# Patient Record
Sex: MALE | Race: WHITE | NOT HISPANIC OR LATINO | Employment: STUDENT | URBAN - METROPOLITAN AREA
[De-identification: names, ages, dates, MRNs, and addresses within clinical notes are randomized per-mention and may not be internally consistent; named-entity substitution may affect disease eponyms.]

---

## 2018-11-09 ENCOUNTER — OFFICE VISIT (OUTPATIENT)
Dept: INTERNAL MEDICINE | Facility: CLINIC | Age: 23
End: 2018-11-09
Payer: COMMERCIAL

## 2018-11-09 VITALS
SYSTOLIC BLOOD PRESSURE: 125 MMHG | OXYGEN SATURATION: 98 % | WEIGHT: 140.19 LBS | BODY MASS INDEX: 20.76 KG/M2 | DIASTOLIC BLOOD PRESSURE: 85 MMHG | HEIGHT: 69 IN | HEART RATE: 79 BPM

## 2018-11-09 DIAGNOSIS — G43.009 MIGRAINE WITHOUT AURA AND WITHOUT STATUS MIGRAINOSUS, NOT INTRACTABLE: Primary | ICD-10-CM

## 2018-11-09 PROCEDURE — 99999 PR PBB SHADOW E&M-EST. PATIENT-LVL III: CPT | Mod: PBBFAC,,, | Performed by: STUDENT IN AN ORGANIZED HEALTH CARE EDUCATION/TRAINING PROGRAM

## 2018-11-09 PROCEDURE — 3008F BODY MASS INDEX DOCD: CPT | Mod: CPTII,S$GLB,, | Performed by: STUDENT IN AN ORGANIZED HEALTH CARE EDUCATION/TRAINING PROGRAM

## 2018-11-09 PROCEDURE — 99203 OFFICE O/P NEW LOW 30 MIN: CPT | Mod: S$GLB,,, | Performed by: STUDENT IN AN ORGANIZED HEALTH CARE EDUCATION/TRAINING PROGRAM

## 2018-11-09 NOTE — PROGRESS NOTES
Internal Medicine Clinic Note  11/9/2018      Subjective:       Patient ID: Mat Toth is a 23 y.o. male being seen for an established visit.    Chief Complaint: Migraine      HPI  Patient is a 23 year old male that presents to clinic for headaches that started roughly 1 hour ago while he was in class. There was no acute inciting event, but he found that he had a throbbing headache behind his right eye that was accompanied by nausea, dizziness, and light sensitivity. The pain waxed and waned and by the time he arrived in clinic the pain had improved; however he still feels a bit sensitive to light. He denies any episodes of vomiting or neck stiffness and has remained afebrile. Patient has had headaches in the past that felt like a band of pressure around his head; but current headache feels much different from the one he is feeling now. He reports  no history of seizures. He has one cousin that had seizures as a child but no longer has them. He reports only minor trauma that occured a week ago when he hit his head when trying to get out of bed. Patient came straight to clinic when he was able to get an appointment and has tried nothing for the headache. 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     Past Medical History:   Diagnosis Date    Asthma     CMV (cytomegalovirus infection)     Pertussis        Past Surgical History:   Procedure Laterality Date    ADENOIDECTOMY      TYMPANOSTOMY TUBE PLACEMENT         No family history on file.    Social History     Socioeconomic History    Marital status: Single     Spouse name: Not on file    Number of children: Not on file    Years of education: Not on file    Highest education level: Not on file   Social Needs    Financial resource strain: Not on file    Food insecurity - worry: Not on file    Food insecurity - inability: Not on file    Transportation needs - medical: Not on file    Transportation needs -  "non-medical: Not on file   Occupational History    Not on file   Tobacco Use    Smoking status: Never Smoker   Substance and Sexual Activity    Alcohol use: No    Drug use: No    Sexual activity: No   Other Topics Concern    Not on file   Social History Narrative    Pt lives [address] with parents, Deepak and Ania-. Brother Obi, Graduated     On swim team competition teams.       Review of Systems   Constitutional: Negative for activity change.   HENT: Negative for hearing loss, sinus pain and tinnitus.    Eyes: Positive for photophobia. Negative for discharge and visual disturbance.   Respiratory: Negative for cough, chest tightness, shortness of breath and wheezing.    Cardiovascular: Negative for chest pain, palpitations and leg swelling.   Gastrointestinal: Negative for abdominal pain, constipation, diarrhea, nausea and vomiting.   Musculoskeletal: Negative for arthralgias, back pain and myalgias.   Skin: Negative for color change and pallor.   Neurological: Positive for dizziness and headaches. Negative for weakness.          Medication List      as of 11/9/2018  2:07 PM     You have not been prescribed any medications.         Patient Active Problem List    Diagnosis Date Noted    CMV (cytomegalovirus infection)     Pertussis            Objective:      /88 (BP Location: Right arm, Patient Position: Sitting, BP Method: Large (Manual))   Pulse 79   Ht 5' 8.9" (1.75 m)   Wt 63.6 kg (140 lb 3.4 oz)   SpO2 98%   BMI 20.77 kg/m²   Estimated body mass index is 20.77 kg/m² as calculated from the following:    Height as of this encounter: 5' 8.9" (1.75 m).    Weight as of this encounter: 63.6 kg (140 lb 3.4 oz).    Physical Exam   Constitutional: He is oriented to person, place, and time. He appears well-developed and well-nourished. No distress.   HENT:   Head: Normocephalic and atraumatic.   Mouth/Throat: No oropharyngeal exudate.   Eyes: Conjunctivae and EOM are normal. Pupils are equal, " round, and reactive to light.   Neck: Normal range of motion. Neck supple.   Cardiovascular: Normal rate, regular rhythm and normal heart sounds.   Pulmonary/Chest: Effort normal and breath sounds normal.   Abdominal: Soft. Bowel sounds are normal.   Musculoskeletal: Normal range of motion.   Neurological: He is alert and oriented to person, place, and time. No cranial nerve deficit or sensory deficit. He exhibits normal muscle tone. Coordination normal.   Psychiatric: He has a normal mood and affect. His behavior is normal.   Vitals reviewed.      Assessment:         1. Migraine without aura and without status migrainosus, not intractable           Plan:         1. Migraine without aura and without status migrainosus, not intractable  - Recommended over the counter ibuprofen 800 mg every 4 to 6 hours as needed for pain.  - patient is normally very active with school and swimming. Advised patient to get 1-2 days bed rest prior to resuming activities  - Advised patient to stay well hydrated  - Advised patient to return to clinic if symptoms fail to improve or worsen. If symptoms become considerably worse, advised to go straight to emergency department.             Patient seen and plan of care discussed with Dr. Declan Florence  Internal Medicine, PGY-1  [phone]

## 2018-11-09 NOTE — PATIENT INSTRUCTIONS
Medication for this can be found over the counter    - Try Ibuprofen 800 mg every 4 to 6 hours as needed for pain  - Stay well hydrated  - Bed rest for today  - Please return to clinic or go to emergency room if symptoms get worse.

## 2020-01-07 ENCOUNTER — OFFICE VISIT (OUTPATIENT)
Dept: URGENT CARE | Facility: CLINIC | Age: 25
End: 2020-01-07
Payer: COMMERCIAL

## 2020-01-07 VITALS
HEART RATE: 72 BPM | DIASTOLIC BLOOD PRESSURE: 76 MMHG | WEIGHT: 140 LBS | BODY MASS INDEX: 20.73 KG/M2 | RESPIRATION RATE: 18 BRPM | SYSTOLIC BLOOD PRESSURE: 142 MMHG | OXYGEN SATURATION: 96 % | TEMPERATURE: 100 F | HEIGHT: 69 IN

## 2020-01-07 DIAGNOSIS — J00 NASOPHARYNGITIS: Primary | ICD-10-CM

## 2020-01-07 DIAGNOSIS — R05.9 COUGH: ICD-10-CM

## 2020-01-07 DIAGNOSIS — R03.0 BLOOD PRESSURE ELEVATED WITHOUT HISTORY OF HTN: ICD-10-CM

## 2020-01-07 LAB
CTP QC/QA: YES
CTP QC/QA: YES
FLUAV AG NPH QL: NEGATIVE
FLUBV AG NPH QL: NEGATIVE
S PYO RRNA THROAT QL PROBE: NEGATIVE

## 2020-01-07 PROCEDURE — 87804 INFLUENZA ASSAY W/OPTIC: CPT | Mod: QW,S$GLB,, | Performed by: STUDENT IN AN ORGANIZED HEALTH CARE EDUCATION/TRAINING PROGRAM

## 2020-01-07 PROCEDURE — 87804 POCT INFLUENZA A/B: ICD-10-PCS | Mod: 59,QW,S$GLB, | Performed by: STUDENT IN AN ORGANIZED HEALTH CARE EDUCATION/TRAINING PROGRAM

## 2020-01-07 PROCEDURE — 99203 OFFICE O/P NEW LOW 30 MIN: CPT | Mod: 25,S$GLB,, | Performed by: STUDENT IN AN ORGANIZED HEALTH CARE EDUCATION/TRAINING PROGRAM

## 2020-01-07 PROCEDURE — 87880 STREP A ASSAY W/OPTIC: CPT | Mod: QW,S$GLB,, | Performed by: STUDENT IN AN ORGANIZED HEALTH CARE EDUCATION/TRAINING PROGRAM

## 2020-01-07 PROCEDURE — 87880 POCT RAPID STREP A: ICD-10-PCS | Mod: QW,S$GLB,, | Performed by: STUDENT IN AN ORGANIZED HEALTH CARE EDUCATION/TRAINING PROGRAM

## 2020-01-07 PROCEDURE — 99203 PR OFFICE/OUTPT VISIT, NEW, LEVL III, 30-44 MIN: ICD-10-PCS | Mod: 25,S$GLB,, | Performed by: STUDENT IN AN ORGANIZED HEALTH CARE EDUCATION/TRAINING PROGRAM

## 2020-01-07 RX ORDER — PREDNISONE 20 MG/1
20 TABLET ORAL DAILY
Qty: 5 TABLET | Refills: 0 | Status: SHIPPED | OUTPATIENT
Start: 2020-01-07 | End: 2020-01-12

## 2020-01-07 RX ORDER — IBUPROFEN 600 MG/1
600 TABLET ORAL EVERY 8 HOURS PRN
Qty: 15 TABLET | Refills: 0 | Status: SHIPPED | OUTPATIENT
Start: 2020-01-07 | End: 2020-01-12

## 2020-01-07 NOTE — LETTER
January 7, 2020      [facility]  [address] [address]  Phone: [phone]  Fax: [phone]       Patient: Mat Toth   YOB: 1995  Date of Visit: 01/07/2020    To Whom It May Concern:    Juma Toth  was at [facility] on 01/07/2020. He may return to school/practice on 01/09/2019 or after 24 hours with no fever with no restrictions. If you have any questions or concerns, or if I can be of further assistance, please do not hesitate to contact me.    Sincerely,    Cameron Jerez MD

## 2020-01-08 ENCOUNTER — OFFICE VISIT (OUTPATIENT)
Dept: INTERNAL MEDICINE | Facility: CLINIC | Age: 25
End: 2020-01-08
Payer: COMMERCIAL

## 2020-01-08 DIAGNOSIS — R51.9 ACUTE INTRACTABLE HEADACHE, UNSPECIFIED HEADACHE TYPE: ICD-10-CM

## 2020-01-08 DIAGNOSIS — B34.9 VIRAL ILLNESS: Primary | ICD-10-CM

## 2020-01-08 DIAGNOSIS — R11.0 NAUSEA: ICD-10-CM

## 2020-01-08 DIAGNOSIS — Z20.6 CONTACT WITH AND (SUSPECTED) EXPOSURE TO HUMAN IMMUNODEFICIENCY VIRUS (HIV): ICD-10-CM

## 2020-01-08 PROCEDURE — S0119 ONDANSETRON 4 MG: HCPCS | Mod: S$GLB,,, | Performed by: INTERNAL MEDICINE

## 2020-01-08 PROCEDURE — 99214 OFFICE O/P EST MOD 30 MIN: CPT | Mod: 25,S$GLB,, | Performed by: INTERNAL MEDICINE

## 2020-01-08 PROCEDURE — 96372 PR INJECTION,THERAP/PROPH/DIAG2ST, IM OR SUBCUT: ICD-10-PCS | Mod: S$GLB,,, | Performed by: INTERNAL MEDICINE

## 2020-01-08 PROCEDURE — 99214 PR OFFICE/OUTPT VISIT, EST, LEVL IV, 30-39 MIN: ICD-10-PCS | Mod: 25,S$GLB,, | Performed by: INTERNAL MEDICINE

## 2020-01-08 PROCEDURE — 99999 PR PBB SHADOW E&M-EST. PATIENT-LVL IV: CPT | Mod: PBBFAC,,, | Performed by: INTERNAL MEDICINE

## 2020-01-08 PROCEDURE — 3008F BODY MASS INDEX DOCD: CPT | Mod: CPTII,S$GLB,, | Performed by: INTERNAL MEDICINE

## 2020-01-08 PROCEDURE — S0119 PR ONDANSETRON, ORAL, 4MG: ICD-10-PCS | Mod: S$GLB,,, | Performed by: INTERNAL MEDICINE

## 2020-01-08 PROCEDURE — 96372 THER/PROPH/DIAG INJ SC/IM: CPT | Mod: S$GLB,,, | Performed by: INTERNAL MEDICINE

## 2020-01-08 PROCEDURE — 3008F PR BODY MASS INDEX (BMI) DOCUMENTED: ICD-10-PCS | Mod: CPTII,S$GLB,, | Performed by: INTERNAL MEDICINE

## 2020-01-08 PROCEDURE — 99999 PR PBB SHADOW E&M-EST. PATIENT-LVL IV: ICD-10-PCS | Mod: PBBFAC,,, | Performed by: INTERNAL MEDICINE

## 2020-01-08 RX ORDER — ONDANSETRON 4 MG/1
4 TABLET, ORALLY DISINTEGRATING ORAL EVERY 8 HOURS PRN
Qty: 10 TABLET | Refills: 0 | Status: SHIPPED | OUTPATIENT
Start: 2020-01-08 | End: 2020-01-13

## 2020-01-08 RX ORDER — KETOROLAC TROMETHAMINE 30 MG/ML
15 INJECTION, SOLUTION INTRAMUSCULAR; INTRAVENOUS ONCE
Status: COMPLETED | OUTPATIENT
Start: 2020-01-08 | End: 2020-01-08

## 2020-01-08 RX ORDER — ONDANSETRON 4 MG/1
4 TABLET, ORALLY DISINTEGRATING ORAL
Status: COMPLETED | OUTPATIENT
Start: 2020-01-08 | End: 2020-01-08

## 2020-01-08 RX ADMIN — ONDANSETRON 4 MG: 4 TABLET, ORALLY DISINTEGRATING ORAL at 03:01

## 2020-01-08 RX ADMIN — KETOROLAC TROMETHAMINE 15 MG: 30 INJECTION, SOLUTION INTRAMUSCULAR; INTRAVENOUS at 04:01

## 2020-01-08 NOTE — PATIENT INSTRUCTIONS
"  Viral Syndrome (Adult)  A viral illness may cause a number of symptoms. The symptoms depend on the part of the body that the virus affects. If it settles in your nose, throat, and lungs, it may cause cough, sore throat, congestion, and sometimes headache. If it settles in your stomach and intestinal tract, it may cause vomiting and diarrhea. Sometimes it causes vague symptoms like "aching all over," feeling tired, loss of appetite, or fever.  A viral illness usually lasts 1 to 2 weeks, but sometimes it lasts longer. In some cases, a more serious infection can look like a viral syndrome in the first few days of the illness. You may need another exam and additional tests to know the difference. Watch for the warning signs listed below.  Home care  Follow these guidelines for taking care of yourself at home:  · If symptoms are severe, rest at home for the first 2 to 3 days.  · Stay away from cigarette smoke - both your smoke and the smoke from others.  · You may use over-the-counter acetaminophen or ibuprofen for fever, muscle aching, and headache, unless another medicine was prescribed for this. If you have chronic liver or kidney disease or ever had a stomach ulcer or GI bleeding, talk with your doctor before using these medicines. No one who is younger than 18 and ill with a fever should take aspirin. It may cause severe disease or death.  · Your appetite may be poor, so a light diet is fine. Avoid dehydration by drinking 8 to 12 8-ounce glasses of fluids each day. This may include water; orange juice; lemonade; apple, grape, and cranberry juice; clear fruit drinks; electrolyte replacement and sports drinks; and decaffeinated teas and coffee. If you have been diagnosed with a kidney disease, ask your doctor how much and what types of fluids you should drink to prevent dehydration. If you have kidney disease, drinking too much fluid can cause it build up in the your body and be dangerous to your " health.  · Over-the-counter remedies won't shorten the length of the illness but may be helpful for cough, sore throat; and nasal and sinus congestion. Don't use decongestants if you have high blood pressure.  Follow-up care  Follow up with your healthcare provider if you do not improve over the next week.  Call 911  Get emergency medical care if any of the following occur:  · Convulsion  · Feeling weak, dizzy, or like you are going to faint  · Chest pain, shortness of breath, wheezing, or difficulty breathing  When to seek medical advice  Call your healthcare provider right away if any of these occur:  · Cough with lots of colored sputum (mucus) or blood in your sputum  · Chest pain, shortness of breath, wheezing, or difficulty breathing  · Severe headache; face, neck, or ear pain  · Severe, constant pain in the lower right side of your belly (abdominal)  · Continued vomiting (cant keep liquids down)  · Frequent diarrhea (more than 5 times a day); blood (red or black color) or mucus in diarrhea  · Feeling weak, dizzy, or like you are going to faint  · Extreme thirst  · Fever of 100.4°F (38°C) or higher, or as directed by your healthcare provider  Date Last Reviewed: 9/25/2015  © 6485-9532 [employer]. [address] [place] [address], [place] [address]. All rights reserved. This information is not intended as a substitute for professional medical care. Always follow your healthcare professional's instructions.

## 2020-01-08 NOTE — PROGRESS NOTES
Subjective:       Patient ID: Mat Toth is a 24 y.o. male.    Chief Complaint: Headache    HPI  25 y/o man here for urgent visit for headache, nausea, fever.    Tx for rectal gonorrhea about 1-1.5 weeks ago (ceftriaxone + azithromycin); after this had some stomach discomfort, joint pain which then resolved.   Fever to 101F yesterday.  Migraine-type headache for about 24hrs  In UC yesterday, treated for nasopharyngitis with prednisone and ibuprofen. No improvement in headache with the ibuprofen - last took this around 8am this morning.  +nausea; vomiting just before visit. Given zofran at that time with some improvement. Able to drink water.  +sick contact with viral illness    Anxious about possible HIV or CMV infection -- did have CMV in the past. Had HIV test done elsewhere at same time as treatment for gonorrhea and this was negative, >2 weeks after potential exposure, but he requests retesting and specifically testing for virus (rather than only antibody testing)    Review of Systems   Constitutional: Positive for appetite change and fever (resolved).   HENT: Positive for sinus pressure and sore throat. Negative for congestion, ear pain and trouble swallowing.    Eyes: Negative for visual disturbance.   Respiratory: Negative for cough and shortness of breath.    Cardiovascular: Negative for chest pain and palpitations.   Gastrointestinal: Positive for nausea and vomiting. Negative for abdominal pain and blood in stool.   Genitourinary: Negative for difficulty urinating, discharge and dysuria.   Musculoskeletal: Negative for arthralgias.        Body aches   Skin: Negative for rash.   Neurological: Positive for headaches. Negative for weakness.   Psychiatric/Behavioral: The patient is nervous/anxious.          Past medical history, surgical history, and family medical history reviewed and updated as appropriate.    Medications and allergies reviewed.     Objective:          Vitals:    01/08/20 1443  "01/08/20 1517   BP: (!) 140/88 130/80   BP Location: Left arm    Patient Position: Sitting    BP Method: Medium (Manual)    Pulse: 83 90   Temp: 98.1 °F (36.7 °C)    SpO2: 97%    Weight: 67.3 kg (148 lb 5.9 oz)    Height: 5' 11" (1.803 m)      Body mass index is 20.69 kg/m².  Physical Exam    Lab Results   Component Value Date    WBC 6.81 10/17/2013    HGB 13.8 10/17/2013    HCT 41.3 10/17/2013     10/17/2013    CHOL 180 06/15/2016    TRIG 127 06/15/2016    HDL 50 06/15/2016    ALT 15 10/17/2013    AST 26 10/17/2013     10/17/2013    K 3.9 10/17/2013     10/17/2013    CREATININE 0.8 10/17/2013    BUN 12 10/17/2013    CO2 29 10/17/2013    TSH 1.046 10/17/2013       Assessment:       1. Viral illness    2. Nausea    3. Acute intractable headache, unspecified headache type    4. Contact with and (suspected) exposure to human immunodeficiency virus (hiv)        Plan:   Mat was seen today for headache.    Diagnoses and all orders for this visit:    Viral illness - discussed that CMV is unlikely and that his recent HIV test should be sufficient to rule out acute infection; he expressed understanding but would like these tests done to be sure.  -     CYTOMEGALOVIRUS (CMV) AB, IGM; Future  -     CBC auto differential; Future    Nausea - zofran given at visit, short-term rx for home as well  -     ondansetron disintegrating tablet 4 mg  -     CYTOMEGALOVIRUS (CMV) AB, IGM; Future  -     ondansetron (ZOFRAN-ODT) 4 MG TbDL; Take 1 tablet (4 mg total) by mouth every 8 (eight) hours as needed (nausea).    Acute intractable headache, unspecified headache type - able to review patient's outside record with recent CMP showing normal liver and renal function. Will give toradol IM for headache; counseled re: avoiding other NSAIDs for 1-2 days  Work on keeping very well hydrated  -     ketorolac injection 15 mg    Contact with and (suspected) exposure to human immunodeficiency virus (hiv)  -     HIV RNA, " quantitative, PCR (only if risky exposure past 2 weeks); Future  -     HIV 1/2 Ag/Ab (4th Gen); Future    Patient handout given with AVS including return precautions    Follow up if symptoms worsen or fail to improve.    Dimitrios Martinez MD  Internal Medicine  [facility]  1/8/2020

## 2020-01-08 NOTE — PROGRESS NOTES
"Subjective:       Patient ID: Mat Toht is a 24 y.o. male.    Vitals:  height is 5' 8.9" (1.75 m) and weight is 63.5 kg (140 lb). His tympanic temperature is 100 °F (37.8 °C). His blood pressure is 142/76 (abnormal) and his pulse is 72. His respiration is 18 and oxygen saturation is 96%.     Chief Complaint: Sore Throat    Patient presents with c. o subjective fever with chills, nausea, body aches, and sore throat that started a week ago.    Sore Throat    This is a new problem. The current episode started in the past 7 days. The problem has been waxing and waning. Neither side of throat is experiencing more pain than the other. Maximum temperature: Subjective. The fever has been present for 3 to 4 days. The pain is at a severity of 5/10. The pain is moderate. Associated symptoms include congestion, headaches, a plugged ear sensation and swollen glands. Pertinent negatives include no abdominal pain, coughing, diarrhea, ear discharge, ear pain, hoarse voice, neck pain, shortness of breath, stridor, trouble swallowing or vomiting. He has had no exposure to strep or mono. He has tried acetaminophen and NSAIDs for the symptoms. The treatment provided mild relief.       Constitution: Positive for chills, fatigue and fever (subjective). Negative for sweating.   HENT: Positive for congestion and sore throat. Negative for ear pain, ear discharge, sinus pain, sinus pressure, trouble swallowing and voice change.    Neck: Negative for neck pain and painful lymph nodes.   Cardiovascular: Negative for chest pain, leg swelling and palpitations.   Eyes: Negative for eye discharge, eye itching and eye redness.   Respiratory: Negative for chest tightness, cough, sputum production, shortness of breath, stridor, wheezing and asthma.    Gastrointestinal: Positive for nausea. Negative for abdominal pain, vomiting and diarrhea.   Musculoskeletal: Positive for muscle ache. Negative for joint pain and joint swelling.   Skin: Negative " "for color change, rash and lesion.   Allergic/Immunologic: Negative for seasonal allergies, asthma, itching and sneezing.   Neurological: Positive for headaches. Negative for dizziness and light-headedness.   Hematologic/Lymphatic: Negative for swollen lymph nodes.   Psychiatric/Behavioral: Negative for confusion, agitation and sleep disturbance.       Objective:       Vitals:    01/07/20 1806   BP: (!) 142/76   Pulse: 72   Resp: 18   Temp: 100 °F (37.8 °C)   TempSrc: Tympanic   SpO2: 96%   Weight: 63.5 kg (140 lb)   Height: 5' 8.9" (1.75 m)     Physical Exam   Constitutional: He is oriented to person, place, and time. He appears well-developed and well-nourished. No distress.   HENT:   Head: Normocephalic and atraumatic.   Right Ear: Hearing, tympanic membrane, external ear and ear canal normal.   Left Ear: Hearing, tympanic membrane, external ear and ear canal normal.   Nose: Rhinorrhea present. No mucosal edema or purulent discharge. Right sinus exhibits no maxillary sinus tenderness and no frontal sinus tenderness. Left sinus exhibits no maxillary sinus tenderness and no frontal sinus tenderness.   Mouth/Throat: Uvula is midline and mucous membranes are normal. Posterior oropharyngeal erythema present. No oropharyngeal exudate, posterior oropharyngeal edema or tonsillar abscesses. Tonsils are 1+ on the right. Tonsils are 1+ on the left. No tonsillar exudate.   Eyes: Conjunctivae and EOM are normal. Right eye exhibits no discharge. Left eye exhibits no discharge.   Neck: Normal range of motion. Neck supple.   Cardiovascular: Normal rate, regular rhythm and normal heart sounds.   No murmur heard.  Pulmonary/Chest: Effort normal and breath sounds normal. No stridor. He has no wheezes. He has no rales.   Abdominal: Soft. Bowel sounds are normal. He exhibits no distension. There is no tenderness.   Musculoskeletal: Normal range of motion. He exhibits no edema or tenderness.   Lymphadenopathy:     He has no cervical " adenopathy.   Neurological: He is alert and oriented to person, place, and time. No cranial nerve deficit (CN II-XII grossly intact).   Skin: Skin is warm, dry and no rash.   Psychiatric: He has a normal mood and affect. His behavior is normal. Judgment and thought content normal.   Nursing note and vitals reviewed.    Recent Lab Results       01/07/20  1844   01/07/20  1831         Acceptable Yes Yes     Rapid Influenza A Ag   Negative     Rapid Influenza B Ag   Negative     RAPID STREP A SCREEN Negative               Assessment:       1. Nasopharyngitis    2. Cough    3. Blood pressure elevated without history of HTN        Plan:         Nasopharyngitis  -     POCT Influenza A/B  -     POCT rapid strep A  -     predniSONE (DELTASONE) 20 MG tablet; Take 1 tablet (20 mg total) by mouth once daily. for 5 days  Dispense: 5 tablet; Refill: 0      - risk of corticosteroids reviewed (elevated BP/glucose, insomnia, psychosis, bone loss, etc) and patient expressed understanding  -     ibuprofen (ADVIL,MOTRIN) 600 MG tablet; Take 1 tablet (600 mg total) by mouth every 8 (eight) hours as needed.  Dispense: 15 tablet; Refill: 0    Cough  - counseled on home care and OTC medications    Blood pressure elevated without history of HTN  - f/u with PCP    Results, medications and diagnosis reviewed with patient, questions answered, and return precautions given    Follow up in about 1 week (around 1/14/2020) for re-evaluation of symptoms and blood pressure with PCP, or sooner as needed.    Cameron Jerez MD/MPH  [facility]  [facility]

## 2020-01-08 NOTE — PATIENT INSTRUCTIONS

## 2020-01-13 VITALS
WEIGHT: 148.38 LBS | TEMPERATURE: 98 F | SYSTOLIC BLOOD PRESSURE: 130 MMHG | BODY MASS INDEX: 20.77 KG/M2 | DIASTOLIC BLOOD PRESSURE: 80 MMHG | HEART RATE: 90 BPM | HEIGHT: 71 IN | OXYGEN SATURATION: 97 %

## 2021-03-19 ENCOUNTER — OFFICE VISIT (OUTPATIENT)
Dept: URGENT CARE | Facility: CLINIC | Age: 26
End: 2021-03-19
Payer: COMMERCIAL

## 2021-03-19 VITALS
WEIGHT: 151 LBS | OXYGEN SATURATION: 98 % | HEIGHT: 71 IN | HEART RATE: 80 BPM | BODY MASS INDEX: 21.14 KG/M2 | TEMPERATURE: 97 F | DIASTOLIC BLOOD PRESSURE: 86 MMHG | SYSTOLIC BLOOD PRESSURE: 135 MMHG

## 2021-03-19 DIAGNOSIS — R05.9 COUGH: ICD-10-CM

## 2021-03-19 DIAGNOSIS — B96.89 ACUTE BACTERIAL BRONCHITIS: Primary | ICD-10-CM

## 2021-03-19 DIAGNOSIS — R53.83 FATIGUE, UNSPECIFIED TYPE: ICD-10-CM

## 2021-03-19 DIAGNOSIS — J20.8 ACUTE BACTERIAL BRONCHITIS: Primary | ICD-10-CM

## 2021-03-19 LAB
CTP QC/QA: YES
SARS-COV-2 RDRP RESP QL NAA+PROBE: NEGATIVE

## 2021-03-19 PROCEDURE — 3008F BODY MASS INDEX DOCD: CPT | Mod: CPTII,S$GLB,, | Performed by: NURSE PRACTITIONER

## 2021-03-19 PROCEDURE — 99213 OFFICE O/P EST LOW 20 MIN: CPT | Mod: S$GLB,,, | Performed by: NURSE PRACTITIONER

## 2021-03-19 PROCEDURE — U0002: ICD-10-PCS | Mod: QW,S$GLB,, | Performed by: NURSE PRACTITIONER

## 2021-03-19 PROCEDURE — U0002 COVID-19 LAB TEST NON-CDC: HCPCS | Mod: QW,S$GLB,, | Performed by: NURSE PRACTITIONER

## 2021-03-19 PROCEDURE — 99213 PR OFFICE/OUTPT VISIT, EST, LEVL III, 20-29 MIN: ICD-10-PCS | Mod: S$GLB,,, | Performed by: NURSE PRACTITIONER

## 2021-03-19 PROCEDURE — 3008F PR BODY MASS INDEX (BMI) DOCUMENTED: ICD-10-PCS | Mod: CPTII,S$GLB,, | Performed by: NURSE PRACTITIONER

## 2021-03-19 RX ORDER — BENZONATATE 200 MG/1
200 CAPSULE ORAL 3 TIMES DAILY PRN
Qty: 30 CAPSULE | Refills: 0 | Status: SHIPPED | OUTPATIENT
Start: 2021-03-19 | End: 2021-03-29

## 2021-03-19 RX ORDER — AZITHROMYCIN 250 MG/1
TABLET, FILM COATED ORAL
Qty: 6 TABLET | Refills: 0 | Status: SHIPPED | OUTPATIENT
Start: 2021-03-19 | End: 2021-03-24

## 2021-03-19 RX ORDER — ALBUTEROL SULFATE 90 UG/1
2 AEROSOL, METERED RESPIRATORY (INHALATION) EVERY 6 HOURS PRN
Qty: 18 G | Refills: 0 | Status: SHIPPED | OUTPATIENT
Start: 2021-03-19

## 2021-05-04 ENCOUNTER — PATIENT MESSAGE (OUTPATIENT)
Dept: RESEARCH | Facility: HOSPITAL | Age: 26
End: 2021-05-04

## 2021-05-10 ENCOUNTER — PATIENT MESSAGE (OUTPATIENT)
Dept: RESEARCH | Facility: HOSPITAL | Age: 26
End: 2021-05-10

## 2023-01-05 ENCOUNTER — OFFICE VISIT (OUTPATIENT)
Dept: INTERNAL MEDICINE | Facility: CLINIC | Age: 28
End: 2023-01-05
Payer: COMMERCIAL

## 2023-01-05 VITALS
BODY MASS INDEX: 21.05 KG/M2 | DIASTOLIC BLOOD PRESSURE: 74 MMHG | OXYGEN SATURATION: 99 % | SYSTOLIC BLOOD PRESSURE: 128 MMHG | WEIGHT: 150.38 LBS | HEART RATE: 114 BPM | HEIGHT: 71 IN | TEMPERATURE: 98 F

## 2023-01-05 DIAGNOSIS — A08.4 VIRAL GASTROENTERITIS: Primary | ICD-10-CM

## 2023-01-05 PROCEDURE — 99999 PR PBB SHADOW E&M-EST. PATIENT-LVL IV: CPT | Mod: PBBFAC,,, | Performed by: INTERNAL MEDICINE

## 2023-01-05 PROCEDURE — 3008F PR BODY MASS INDEX (BMI) DOCUMENTED: ICD-10-PCS | Mod: CPTII,S$GLB,, | Performed by: INTERNAL MEDICINE

## 2023-01-05 PROCEDURE — 1160F RVW MEDS BY RX/DR IN RCRD: CPT | Mod: CPTII,S$GLB,, | Performed by: INTERNAL MEDICINE

## 2023-01-05 PROCEDURE — 3078F DIAST BP <80 MM HG: CPT | Mod: CPTII,S$GLB,, | Performed by: INTERNAL MEDICINE

## 2023-01-05 PROCEDURE — 3074F SYST BP LT 130 MM HG: CPT | Mod: CPTII,S$GLB,, | Performed by: INTERNAL MEDICINE

## 2023-01-05 PROCEDURE — 3008F BODY MASS INDEX DOCD: CPT | Mod: CPTII,S$GLB,, | Performed by: INTERNAL MEDICINE

## 2023-01-05 PROCEDURE — 99214 OFFICE O/P EST MOD 30 MIN: CPT | Mod: S$GLB,,, | Performed by: INTERNAL MEDICINE

## 2023-01-05 PROCEDURE — 99999 PR PBB SHADOW E&M-EST. PATIENT-LVL IV: ICD-10-PCS | Mod: PBBFAC,,, | Performed by: INTERNAL MEDICINE

## 2023-01-05 PROCEDURE — 1160F PR REVIEW ALL MEDS BY PRESCRIBER/CLIN PHARMACIST DOCUMENTED: ICD-10-PCS | Mod: CPTII,S$GLB,, | Performed by: INTERNAL MEDICINE

## 2023-01-05 PROCEDURE — 99214 PR OFFICE/OUTPT VISIT, EST, LEVL IV, 30-39 MIN: ICD-10-PCS | Mod: S$GLB,,, | Performed by: INTERNAL MEDICINE

## 2023-01-05 PROCEDURE — 1159F MED LIST DOCD IN RCRD: CPT | Mod: CPTII,S$GLB,, | Performed by: INTERNAL MEDICINE

## 2023-01-05 PROCEDURE — 3074F PR MOST RECENT SYSTOLIC BLOOD PRESSURE < 130 MM HG: ICD-10-PCS | Mod: CPTII,S$GLB,, | Performed by: INTERNAL MEDICINE

## 2023-01-05 PROCEDURE — 3078F PR MOST RECENT DIASTOLIC BLOOD PRESSURE < 80 MM HG: ICD-10-PCS | Mod: CPTII,S$GLB,, | Performed by: INTERNAL MEDICINE

## 2023-01-05 PROCEDURE — 1159F PR MEDICATION LIST DOCUMENTED IN MEDICAL RECORD: ICD-10-PCS | Mod: CPTII,S$GLB,, | Performed by: INTERNAL MEDICINE

## 2023-01-05 RX ORDER — DICYCLOMINE HYDROCHLORIDE 10 MG/1
10 CAPSULE ORAL 4 TIMES DAILY PRN
Qty: 120 CAPSULE | Refills: 0 | Status: SHIPPED | OUTPATIENT
Start: 2023-01-05 | End: 2023-02-04

## 2023-01-05 NOTE — PATIENT INSTRUCTIONS
Fluids! Fluids! Fluids! (Water, pedialyte, gatorade or powerade)  Diarrhea: Imodium  Abdominal cramping: Bentyl    If unable to keep up with fluid intake and you have signs of dehydration, go to ER for IV fluids.     If still having issues with diarrhea 6 weeks from now, please alert me or PCP for GI referral.

## 2023-01-05 NOTE — PROGRESS NOTES
Patient ID: Mat Toth is a 27 y.o. male.    Chief Complaint: Abdominal Cramping, Diarrhea, Chills, and Generalized Body Aches    HPI Mat is a 27 y.o. male who presents today with chief complaint of diarrhea. He is a patient of Dr Marino and today is his first visit with me.   Onset was Monday. Associated with symptoms of fever (Tmax of 103), chills, sweats, body aches, abdominal cramping, nausea and vomiting and small amount of blood in stool. (Nausea and vomiting only lasted one day). Diarrhea was occurring every 30-60 min but started slowing down yesterday.  He took Tylenol and Imodium which provided only minimal relief.  Nothing has completely relieved the symptoms.  Symptoms have been constant in duration but overall improved over time.  He denies any recent travel outside the country. Says he had close contact with someone from [facility] over the weekend. And had close contact with friends in [place] recently who had norovirus.    Review of Systems   Constitutional:  Positive for fever.   Gastrointestinal:         See HPI   All other systems reviewed and are negative.      Objective:     Vitals:    01/05/23 1317   BP: 128/74   Pulse: (!) 114   Temp: 97.6 °F (36.4 °C)        Physical Exam  Vitals reviewed.   Constitutional:       General: He is not in acute distress.     Appearance: Normal appearance. He is well-developed. He is not ill-appearing, toxic-appearing or diaphoretic.   HENT:      Head: Normocephalic and atraumatic.      Right Ear: External ear normal.      Left Ear: External ear normal.      Nose: Nose normal.   Eyes:      Extraocular Movements: Extraocular movements intact.      Conjunctiva/sclera: Conjunctivae normal.   Cardiovascular:      Rate and Rhythm: Normal rate and regular rhythm.      Pulses: Normal pulses.      Heart sounds: Normal heart sounds. No murmur heard.    No friction rub. No gallop.   Pulmonary:      Effort: Pulmonary effort is normal. No respiratory distress.       Breath sounds: Normal breath sounds. No stridor. No wheezing, rhonchi or rales.   Abdominal:      General: Bowel sounds are normal. There is no distension.      Palpations: Abdomen is soft. There is no mass.      Tenderness: There is no abdominal tenderness. There is no guarding or rebound.      Hernia: No hernia is present.   Musculoskeletal:      Right lower leg: No edema.      Left lower leg: No edema.   Skin:     General: Skin is warm and dry.   Neurological:      General: No focal deficit present.      Mental Status: He is alert and oriented to person, place, and time. Mental status is at baseline.   Psychiatric:         Mood and Affect: Mood normal.         Behavior: Behavior normal.         Thought Content: Thought content normal.         Judgment: Judgment normal.       Assessment:       1. Viral gastroenteritis Active       Plan:         Viral gastroenteritis  Comments:  See AVS  Orders:  -     dicyclomine (BENTYL) 10 MG capsule; Take 1 capsule (10 mg total) by mouth 4 (four) times daily as needed (stomach cramping).  Dispense: 120 capsule; Refill: 0      RTC PRN    Warning signs discussed, patient to call with any further issues or worsening of symptoms.       Parts of the above note were dictated using a voice dictation software. Please excuse any grammatical or typographical errors.

## 2023-01-18 ENCOUNTER — NURSE TRIAGE (OUTPATIENT)
Dept: ADMINISTRATIVE | Facility: CLINIC | Age: 28
End: 2023-01-18
Payer: COMMERCIAL

## 2023-01-18 NOTE — TELEPHONE ENCOUNTER
Having diarrhea x 2.5 weeks, this morning passed bright red blood mixed in with stool. PMH of fissure but only minimal blood when wiping. Abd pain prior to passing stool. Advised per protocol. Go to ED for eval. VU and agreed.   Reason for Disposition   Pale skin (pallor) of new-onset or worsening    Additional Information   Negative: Passed out (i.e., fainted, collapsed and was not responding)   Negative: Shock suspected (e.g., cold/pale/clammy skin, too weak to stand, low BP, rapid pulse)   Negative: Vomiting red blood or black (coffee ground) material   Negative: Sounds like a life-threatening emergency to the triager    Protocols used: Rectal Bleeding-A-OH

## 2023-01-19 ENCOUNTER — OFFICE VISIT (OUTPATIENT)
Dept: GASTROENTEROLOGY | Facility: CLINIC | Age: 28
End: 2023-01-19
Payer: COMMERCIAL

## 2023-01-19 VITALS
HEART RATE: 77 BPM | HEIGHT: 71 IN | WEIGHT: 149.69 LBS | DIASTOLIC BLOOD PRESSURE: 88 MMHG | SYSTOLIC BLOOD PRESSURE: 133 MMHG | BODY MASS INDEX: 20.96 KG/M2

## 2023-01-19 DIAGNOSIS — R19.7 DIARRHEA, UNSPECIFIED TYPE: Primary | ICD-10-CM

## 2023-01-19 PROCEDURE — 1159F PR MEDICATION LIST DOCUMENTED IN MEDICAL RECORD: ICD-10-PCS | Mod: CPTII,S$GLB,, | Performed by: NURSE PRACTITIONER

## 2023-01-19 PROCEDURE — 3079F PR MOST RECENT DIASTOLIC BLOOD PRESSURE 80-89 MM HG: ICD-10-PCS | Mod: CPTII,S$GLB,, | Performed by: NURSE PRACTITIONER

## 2023-01-19 PROCEDURE — 99999 PR PBB SHADOW E&M-EST. PATIENT-LVL III: CPT | Mod: PBBFAC,,, | Performed by: NURSE PRACTITIONER

## 2023-01-19 PROCEDURE — 3079F DIAST BP 80-89 MM HG: CPT | Mod: CPTII,S$GLB,, | Performed by: NURSE PRACTITIONER

## 2023-01-19 PROCEDURE — 1160F RVW MEDS BY RX/DR IN RCRD: CPT | Mod: CPTII,S$GLB,, | Performed by: NURSE PRACTITIONER

## 2023-01-19 PROCEDURE — 99203 OFFICE O/P NEW LOW 30 MIN: CPT | Mod: S$GLB,,, | Performed by: NURSE PRACTITIONER

## 2023-01-19 PROCEDURE — 3008F BODY MASS INDEX DOCD: CPT | Mod: CPTII,S$GLB,, | Performed by: NURSE PRACTITIONER

## 2023-01-19 PROCEDURE — 3008F PR BODY MASS INDEX (BMI) DOCUMENTED: ICD-10-PCS | Mod: CPTII,S$GLB,, | Performed by: NURSE PRACTITIONER

## 2023-01-19 PROCEDURE — 3075F PR MOST RECENT SYSTOLIC BLOOD PRESS GE 130-139MM HG: ICD-10-PCS | Mod: CPTII,S$GLB,, | Performed by: NURSE PRACTITIONER

## 2023-01-19 PROCEDURE — 99203 PR OFFICE/OUTPT VISIT, NEW, LEVL III, 30-44 MIN: ICD-10-PCS | Mod: S$GLB,,, | Performed by: NURSE PRACTITIONER

## 2023-01-19 PROCEDURE — 1160F PR REVIEW ALL MEDS BY PRESCRIBER/CLIN PHARMACIST DOCUMENTED: ICD-10-PCS | Mod: CPTII,S$GLB,, | Performed by: NURSE PRACTITIONER

## 2023-01-19 PROCEDURE — 99999 PR PBB SHADOW E&M-EST. PATIENT-LVL III: ICD-10-PCS | Mod: PBBFAC,,, | Performed by: NURSE PRACTITIONER

## 2023-01-19 PROCEDURE — 3075F SYST BP GE 130 - 139MM HG: CPT | Mod: CPTII,S$GLB,, | Performed by: NURSE PRACTITIONER

## 2023-01-19 PROCEDURE — 1159F MED LIST DOCD IN RCRD: CPT | Mod: CPTII,S$GLB,, | Performed by: NURSE PRACTITIONER

## 2023-01-19 RX ORDER — VALACYCLOVIR HYDROCHLORIDE 1 G/1
1000 TABLET, FILM COATED ORAL
COMMUNITY
Start: 2023-01-05

## 2023-01-19 RX ORDER — EMTRICITABINE AND TENOFOVIR DISOPROXIL FUMARATE 200; 300 MG/1; MG/1
TABLET, FILM COATED ORAL
COMMUNITY

## 2023-01-19 RX ORDER — ONDANSETRON 4 MG/1
4 TABLET, ORALLY DISINTEGRATING ORAL EVERY 6 HOURS PRN
Qty: 60 TABLET | Refills: 0 | Status: SHIPPED | OUTPATIENT
Start: 2023-01-19

## 2023-01-19 RX ORDER — METRONIDAZOLE 500 MG/1
500 TABLET ORAL EVERY 8 HOURS
Qty: 21 TABLET | Refills: 0 | Status: SHIPPED | OUTPATIENT
Start: 2023-01-19 | End: 2023-01-26

## 2023-01-19 NOTE — PROGRESS NOTES
"Subjective:       Patient ID: Mat Toth is a 27 y.o. male.    Chief Complaint: Diarrhea and Rectal Bleeding    26 y/o male presents to clinic for urgent care follow up with c/o diarrhea and rectal bleeding. Patient initially seen in urgent care with aforementioned symptoms two weeks ago. Abdominal pain, n/v, and fever now resolved. Diarrhea persists with least 2-3 BMs/day with water-soft stool. Bright red blood noticed in toilet after BMs. Does not have rectal pain or history of hemorrhoids.      Past Medical History:   Diagnosis Date    Asthma     CMV (cytomegalovirus infection)     Pertussis        Past Surgical History:   Procedure Laterality Date    ADENOIDECTOMY      TYMPANOSTOMY TUBE PLACEMENT         History reviewed. No pertinent family history.    Social History     Socioeconomic History    Marital status: Single   Tobacco Use    Smoking status: Never    Smokeless tobacco: Never   Substance and Sexual Activity    Alcohol use: Yes     Comment: soc    Drug use: No    Sexual activity: Yes     Partners: Male   Social History Narrative    Pt lives 50/50 with parents, Deepak and Ania-. Brother Obi, Graduated     On swim team competition teams.       Review of Systems   Constitutional:  Negative for appetite change and unexpected weight change.   HENT:  Negative for trouble swallowing.    Respiratory:  Negative for shortness of breath.    Cardiovascular:  Negative for chest pain.   Gastrointestinal:  Positive for blood in stool and diarrhea.   Hematological:  Negative for adenopathy. Does not bruise/bleed easily.   Psychiatric/Behavioral:  Negative for dysphoric mood.        Objective:     Vitals:    01/19/23 1332   BP: 133/88   BP Location: Left arm   Patient Position: Sitting   BP Method: Large (Automatic)   Pulse: 77   Weight: 67.9 kg (149 lb 11.2 oz)   Height: 5' 11" (1.803 m)          Physical Exam  Constitutional:       General: He is not in acute distress.     Appearance: Normal " appearance. He is not ill-appearing.   HENT:      Head: Normocephalic.   Eyes:      Conjunctiva/sclera: Conjunctivae normal.      Pupils: Pupils are equal, round, and reactive to light.   Pulmonary:      Effort: Pulmonary effort is normal. No respiratory distress.   Musculoskeletal:         General: Normal range of motion.      Cervical back: Normal range of motion.   Skin:     General: Skin is warm and dry.   Neurological:      Mental Status: He is alert and oriented to person, place, and time.   Psychiatric:         Mood and Affect: Mood normal.         Behavior: Behavior normal.             Assessment:         ICD-10-CM ICD-9-CM   1. Diarrhea, unspecified type  R19.7 787.91       Plan:       Diarrhea, unspecified type  -     H. pylori antigen, stool; Future; Expected date: 01/19/2023  -     Giardia / Cryptosporidum, EIA; Future; Expected date: 01/19/2023  -     Stool Exam-Ova,Cysts,Parasites; Future; Expected date: 01/19/2023  -     Stool culture; Future; Expected date: 01/19/2023  -     WBC, Stool; Future; Expected date: 01/19/2023  -     metroNIDAZOLE (FLAGYL) 500 MG tablet; Take 1 tablet (500 mg total) by mouth every 8 (eight) hours. for 7 days  Dispense: 21 tablet; Refill: 0    Other orders  -     ondansetron (ZOFRAN-ODT) 4 MG TbDL; Take 1 tablet (4 mg total) by mouth every 6 (six) hours as needed (nausea).  Dispense: 60 tablet; Refill: 0      Follow up if symptoms worsen or fail to improve.     Patient's Medications   New Prescriptions    METRONIDAZOLE (FLAGYL) 500 MG TABLET    Take 1 tablet (500 mg total) by mouth every 8 (eight) hours. for 7 days    ONDANSETRON (ZOFRAN-ODT) 4 MG TBDL    Take 1 tablet (4 mg total) by mouth every 6 (six) hours as needed (nausea).   Previous Medications    ALBUTEROL (VENTOLIN HFA) 90 MCG/ACTUATION INHALER    Inhale 2 puffs into the lungs every 6 (six) hours as needed (cough). Rescue    DICYCLOMINE (BENTYL) 10 MG CAPSULE    Take 1 capsule (10 mg total) by mouth 4 (four) times  daily as needed (stomach cramping).    EMTRICITABINE-TENOFOVIR 200-300 MG (TRUVADA) 200-300 MG TAB        VALACYCLOVIR (VALTREX) 1000 MG TABLET    Take 1,000 mg by mouth.   Modified Medications    No medications on file   Discontinued Medications    No medications on file

## 2025-06-16 ENCOUNTER — TELEPHONE (OUTPATIENT)
Dept: NEUROLOGY | Facility: CLINIC | Age: 30
End: 2025-06-16
Payer: COMMERCIAL

## 2025-06-16 NOTE — TELEPHONE ENCOUNTER
Spoke with the patient who'd incorrectly self-scheduled with Dr. Walker for fasciculations. Pt has experienced intermittent diffuse fasciculations intermittently over the last year. Denies difficulty swallowing or breathing, speech clear. 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 PMH includes anxiety for which he takes Propanolol PRN. Cut out caffeine x last six months. Play pickleball and noticed recent lower leg fasciculations while standing but otherwise, these involuntary spasms occur at rest. Recently diagnosed with Vit D deficiency and B12 on lower end of normal. Is supplementing B 12 x 2 weeks but not yet replacing Vit D. Will send labs via email. Offered next available appointment with Dr. Wiseman 7/17 at 9 am. Pt accepted next available, verbalized understanding, and repeated back date/time/location of scheduled appointment.

## 2025-06-18 ENCOUNTER — TELEPHONE (OUTPATIENT)
Dept: NEUROLOGY | Facility: CLINIC | Age: 30
End: 2025-06-18
Payer: COMMERCIAL

## 2025-06-19 NOTE — TELEPHONE ENCOUNTER
Called patient to reschedule NP appt to same day (7/17) but at 9:30am. Patient's mother verbalized agreement and said pt would call us back to reschedule if pt needed to.

## 2025-06-25 ENCOUNTER — TELEPHONE (OUTPATIENT)
Dept: NEUROLOGY | Facility: CLINIC | Age: 30
End: 2025-06-25
Payer: COMMERCIAL

## 2025-06-25 NOTE — TELEPHONE ENCOUNTER
Spoke with the patient. Moved appointment 30 min earlier same day. Pt accepted next available 7/17 at 9 am with Dr Mesfin Luo, verbalized understanding, and repeated back date/time/location of scheduled appointment.

## 2025-07-15 ENCOUNTER — TELEPHONE (OUTPATIENT)
Dept: NEUROLOGY | Facility: CLINIC | Age: 30
End: 2025-07-15
Payer: COMMERCIAL

## 2025-07-15 NOTE — TELEPHONE ENCOUNTER
Spoke with the patient to r/s from this Thur since Dr Dueñas is not faculty. Offered next available appointment 7/31 at 8am with Dr Clark. Pt accepted next available, verbalized understanding, and repeated back date/time/location of scheduled appointment.

## 2025-07-15 NOTE — TELEPHONE ENCOUNTER
----- Message from Heladio Luo  sent at 7/15/2025 12:18 PM CDT -----  Regarding: Rescheduling  Franky Pabon,     Was looking forward to meeting him- but since Dr. Dueñas is out, can you reschedule this patient for a time when he'll be back in clinic?    Thanks,  Mesfin

## 2025-07-31 ENCOUNTER — OFFICE VISIT (OUTPATIENT)
Dept: NEUROLOGY | Facility: CLINIC | Age: 30
End: 2025-07-31
Payer: COMMERCIAL

## 2025-07-31 ENCOUNTER — LAB VISIT (OUTPATIENT)
Dept: LAB | Facility: HOSPITAL | Age: 30
End: 2025-07-31
Payer: COMMERCIAL

## 2025-07-31 VITALS
HEIGHT: 71 IN | HEART RATE: 108 BPM | DIASTOLIC BLOOD PRESSURE: 97 MMHG | BODY MASS INDEX: 22.56 KG/M2 | SYSTOLIC BLOOD PRESSURE: 153 MMHG | WEIGHT: 161.19 LBS

## 2025-07-31 DIAGNOSIS — G95.9 MYELOPATHY: ICD-10-CM

## 2025-07-31 DIAGNOSIS — G95.9 MYELOPATHY: Primary | ICD-10-CM

## 2025-07-31 LAB
CK SERPL-CCNC: 100 U/L (ref 20–200)
VIT B12 SERPL-MCNC: 923 PG/ML (ref 210–950)

## 2025-07-31 PROCEDURE — 36415 COLL VENOUS BLD VENIPUNCTURE: CPT

## 2025-07-31 PROCEDURE — 99999 PR PBB SHADOW E&M-EST. PATIENT-LVL III: CPT | Mod: PBBFAC,,,

## 2025-07-31 PROCEDURE — 83884 ASSAY NEURFLMNT LIGHT CHAIN: CPT

## 2025-07-31 PROCEDURE — 84207 ASSAY OF VITAMIN B-6: CPT

## 2025-07-31 PROCEDURE — 82607 VITAMIN B-12: CPT

## 2025-07-31 PROCEDURE — 84630 ASSAY OF ZINC: CPT

## 2025-07-31 PROCEDURE — 82525 ASSAY OF COPPER: CPT

## 2025-07-31 PROCEDURE — 82550 ASSAY OF CK (CPK): CPT

## 2025-07-31 RX ORDER — LISINOPRIL 10 MG/1
10 TABLET ORAL DAILY
COMMUNITY

## 2025-07-31 RX ORDER — EMTRICITABINE AND TENOFOVIR ALAFENAMIDE 200; 25 MG/1; MG/1
1 TABLET ORAL DAILY
COMMUNITY
Start: 2025-07-11

## 2025-07-31 RX ORDER — PROPRANOLOL HYDROCHLORIDE 20 MG/5ML
10 SOLUTION ORAL
COMMUNITY

## 2025-07-31 NOTE — PROGRESS NOTES
[facility] - NEUROLOGY [address]TH [address] [facility], [place]    Date: 7/31/25  Patient Name: Mat Toth   MRN: [id number]   PCP: Allan Hernandez  Referring Provider: Magdalena Chacko MD    Assessment:   Mat Toth is a 29 y.o. male presenting for evaluation of fasciculation. Reported symptoms include left eye twitching, generalized body twitching, and tongue fasciculations. Symptoms have improved since initiating supplementation with vitamin B12, vitamin D, and magnesium.    Physical examination is notable for brisk reflexes, as well as the presence of pectoralis and crossed adductor reflexes.    Laboratory results provided by the patient show decreased vitamin B12 levels, which he is currently supplementing.    Differential diagnoses include, but are not limited to, vitamin deficiency--particularly given the improvement with B12 supplementation. Although less likely at this time, motor neuron disease, peripheral neuropathies, and myopathies cannot be fully ruled out.      Plan:   Will order CK, NFL, Vitamin B12, copper, zinc, B6 levels.   Will schedule for an EMG.   RTC in 3 months. Remainder of plan as detailed below.    Problem List Items Addressed This Visit    None  Visit Diagnoses         Myelopathy    -  Primary    Relevant Orders    CK (Completed)    Neurofilament Light Chain    Vitamin B12 (Completed)    Copper, serum    Zinc    EMG W/ ULTRASOUND AND NERVE CONDUCTION TEST 4 Extremities    Vitamin B6            Evelyn Medina MD  PGY-II  Neurology Department  [facility]   Subjective:   Patient seen in consultation at the request of Magdalena Chacko MD for the evaluation of fasciculations. A copy of this note will be sent to the referring physician.       HPI    30 y/o male with hx of cryptorchism s/p orchidectomy that present to the clinic for evaluation of fasciulation.The patient reports that his initial symptoms  were confined to the left eye, beginning around March 20-24. However, over the past 4-6 months, he began experiencing generalized muscle twitches involving the entire body. These fasciculations typically occur intermittently, lasting for a few seconds to a couple of minutes, and were occurring throughout the day. Last month, he noticed stronger, more pronounced twitches, which led to increased concern. In response, he began supplementing with vitamin B12, vitamin D, and magnesium. Since starting supplementation, he reports significant improvement, although he continues to experience episodes approximately every other hour.  The twitching is most prominent in the triceps, gluteal, and quadriceps muscles. He denies any muscle weakness, imbalance, increased frequency of falls, dysphagia, or dysarthria. However, he does report experiencing mild blurry vision.            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    SOCIAL HISTORY:  Social History[2]    Review of Systems:  12 system review of systems is negative except for the symptoms mentioned in HPI.     "  Objective:   General Examination  Vitals:    07/31/25 0807   BP: (!) 153/97   Pulse: 108   Weight: 73.1 kg (161 lb 2.5 oz)   Height: 5' 11" (1.803 m)     General: NAD, well nourished   Eyes: no tearing, discharge, no erythema   ENT: moist mucous membranes of the oral cavity   Neck: Supple, full range of motion  Cardiovascular: Warm and well perfuse  Lungs: Normal work of breathing, normal chest wall excursions  Skin: No rash, lesions, or breakdown on exposed skin  Psychiatry: Mood and affect are appropriate   Abdomen: soft, non tender, non distended  Extremeties: No cyanosis, clubbing or edema.    Neurological Examination  MENTAL STATUS  Level of consciousness: alert  Orientation: oriented to person, place, time, situation  Attention: normal.   Concentration: normal.  Speech: normal    CRANIAL NERVES  CN II: visual fields full to confrontation;   CN III, IV, VI: PERRL, EOMI  CN V: facial sensation intact, muscles of mastication intact  CN VII: facial expression symmetric and full  CN VIII: hearing intact to finger rub  CN IX, X: symmetric palate elevation; phonation normal  CN XI: shoulder shrug and head turn intact bilaterally  CN XII: tongue midline, no deviation upon protrusion, no atrophy or fasciculations    MOTOR EXAM  Muscle bulk: normal  Muscle tone: normal  Pronator drift: absent    Strength - Upper Extremities   Arm abduction Elbow flexion Elbow extension Forearm pronation Wrist flexion Wrist extension Finger abduction   Right 5/5 5/5 5/5 5/5 5/5 5/5 5/5   Left 5/5 5/5 5/5 5/5 5/5 5/5 5/5     Strength - Lower Extremities   Hip flexion Knee flexion Knee extension Dorsiflexion Plantarflexion   Right 5/5 5/5 5/5 5/5 5/5   Left 5/5 5/5 5/5 5/5 5/5     REFLEXES   Biceps Triceps Brachioradialis Patellar Achilles   Right +3 +3 +3 +3 +3   Left +3 +3 +3  +2 +2     Jaw-jerk reflex: normal  Diggs's/Tromner's sign: negative bilaterally  Cross abductor reflex: Positive bilaterally  Babinski's: down-going " bilaterally  Pectoralis sing: Positive    SENSORY EXAM  Light touch: intact in all 4 extremities  Vibration: intact in all 4 extremities  Temperature: intact in all 4 extremities  Pinprick: intact in all 4 extremities      COORDINATION  Finger to nose: normal  Heel to shin: normal  Tremor: none  Gait: steady with normal arm swing, base, and turning  Romberg's sign: absent  Two beat clonus on exam      Chart Review    Laboratories reviewed  B12: 365  Folate 9.1  TSH 0.822  RF (-)  Vit D 37.2  JOSELITO -   ESR (wnl)  Crp (wnl)        [1]   Current Outpatient Medications   Medication Sig Dispense Refill    albuterol (VENTOLIN HFA) 90 mcg/actuation inhaler Inhale 2 puffs into the lungs every 6 (six) hours as needed (cough). Rescue (Patient not taking: Reported on 1/19/2023) 18 g 0    emtricitabine-tenofovir 200-300 mg (TRUVADA) 200-300 mg Tab       ondansetron (ZOFRAN-ODT) 4 MG TbDL Take 1 tablet (4 mg total) by mouth every 6 (six) hours as needed (nausea). 60 tablet 0    valACYclovir (VALTREX) 1000 MG tablet Take 1,000 mg by mouth.       No current facility-administered medications for this visit.   [2]   Social History  Tobacco Use    Smoking status: Never    Smokeless tobacco: Never   Substance Use Topics    Alcohol use: Yes     Comment: soc    Drug use: No

## 2025-08-04 LAB
NEUROFILAMENT LIGHT CHAIN, PLASMA: 5.1 PG/ML
W ZINC: 67 UG/DL

## 2025-08-05 ENCOUNTER — PATIENT MESSAGE (OUTPATIENT)
Dept: NEUROLOGY | Facility: CLINIC | Age: 30
End: 2025-08-05
Payer: COMMERCIAL

## 2025-08-05 DIAGNOSIS — E53.1 VITAMIN B6 DEFICIENCY: Primary | ICD-10-CM

## 2025-08-05 LAB
W COPPER: 817 UG/L
W VITAMIN B6: 3 UG/L

## 2025-08-05 RX ORDER — LANOLIN ALCOHOL/MO/W.PET/CERES
50 CREAM (GRAM) TOPICAL DAILY
Qty: 90 TABLET | Refills: 1 | Status: SHIPPED | OUTPATIENT
Start: 2025-08-05

## 2025-08-11 ENCOUNTER — PATIENT MESSAGE (OUTPATIENT)
Facility: CLINIC | Age: 30
End: 2025-08-11
Payer: COMMERCIAL